# Patient Record
Sex: FEMALE | Race: WHITE | NOT HISPANIC OR LATINO | Employment: FULL TIME | URBAN - METROPOLITAN AREA
[De-identification: names, ages, dates, MRNs, and addresses within clinical notes are randomized per-mention and may not be internally consistent; named-entity substitution may affect disease eponyms.]

---

## 2021-02-26 ENCOUNTER — OFFICE VISIT (OUTPATIENT)
Dept: URGENT CARE | Facility: CLINIC | Age: 41
End: 2021-02-26
Payer: MEDICAID

## 2021-02-26 VITALS
DIASTOLIC BLOOD PRESSURE: 64 MMHG | HEART RATE: 74 BPM | TEMPERATURE: 97.7 F | SYSTOLIC BLOOD PRESSURE: 106 MMHG | OXYGEN SATURATION: 99 % | RESPIRATION RATE: 16 BRPM

## 2021-02-26 DIAGNOSIS — J40 BRONCHITIS: Primary | ICD-10-CM

## 2021-02-26 PROCEDURE — 99203 OFFICE O/P NEW LOW 30 MIN: CPT | Performed by: PHYSICIAN ASSISTANT

## 2021-02-26 RX ORDER — PREDNISONE 20 MG/1
40 TABLET ORAL DAILY
Qty: 10 TABLET | Refills: 0 | Status: SHIPPED | OUTPATIENT
Start: 2021-02-26 | End: 2021-03-03

## 2021-02-26 RX ORDER — ALBUTEROL SULFATE 90 UG/1
1 AEROSOL, METERED RESPIRATORY (INHALATION) EVERY 4 HOURS PRN
COMMUNITY
Start: 2020-11-28

## 2021-02-26 NOTE — PATIENT INSTRUCTIONS
Acute Bronchitis   WHAT YOU NEED TO KNOW:   Acute bronchitis is swelling and irritation in your lungs  It is usually caused by a virus and most often happens in the winter  Bronchitis may also be caused by bacteria or by a chemical irritant, such as smoke  DISCHARGE INSTRUCTIONS:   Return to the emergency department if:   · You cough up blood  · Your lips or fingernails turn blue  · You feel like you are not getting enough air when you breathe  Call your doctor if:   · Your symptoms do not go away or get worse, even after treatment  · Your cough does not get better within 4 weeks  · You have questions or concerns about your condition or care  Medicines: You may  need any of the following:  · Cough suppressants  decrease your urge to cough  · Decongestants  help loosen mucus in your lungs and make it easier to cough up  This can help you breathe easier  · Inhalers  may be given  Your healthcare provider may give you one or more inhalers to help you breathe easier and cough less  An inhaler gives your medicine to open your airways  Ask your healthcare provider to show you how to use your inhaler correctly  · Acetaminophen  decreases pain and fever  It is available without a doctor's order  Ask how much to take and how often to take it  Follow directions  Read the labels of all other medicines you are using to see if they also contain acetaminophen, or ask your doctor or pharmacist  Acetaminophen can cause liver damage if not taken correctly  Do not use more than 4 grams (4,000 milligrams) total of acetaminophen in one day  · NSAIDs  help decrease swelling and pain or fever  This medicine is available with or without a doctor's order  NSAIDs can cause stomach bleeding or kidney problems in certain people  If you take blood thinner medicine, always ask your healthcare provider if NSAIDs are safe for you  Always read the medicine label and follow directions      · Take your medicine as directed  Contact your healthcare provider if you think your medicine is not helping or if you have side effects  Tell him of her if you are allergic to any medicine  Keep a list of the medicines, vitamins, and herbs you take  Include the amounts, and when and why you take them  Bring the list or the pill bottles to follow-up visits  Carry your medicine list with you in case of an emergency  Self-care:   · Drink liquids as directed  You may need to drink more liquids than usual to stay hydrated  Ask how much liquid to drink each day and which liquids are best for you  · Use a cool mist humidifier  to increase air moisture in your home  This may make it easier for you to breathe and help decrease your cough  · Get more rest   Rest helps your body to heal  Slowly start to do more each day  Rest when you feel it is needed  · Avoid irritants in the air  Avoid chemicals, fumes, and dust  Wear a face mask if you must work around dust or fumes  Stay inside on days when air pollution levels are high  If you have allergies, stay inside when pollen counts are high  Do not use aerosol products, such as spray-on deodorant, bug spray, and hair spray  · Do not smoke or be around others who are smoking  Nicotine and other chemicals in cigarettes and cigars can cause lung damage  Ask your healthcare provider for information if you currently smoke and need help to quit  E-cigarettes or smokeless tobacco still contain nicotine  Talk to your healthcare provider before you use these products  Prevent acute bronchitis:       · Get the vaccinations you need  Ask your healthcare provider if you should get the flu or pneumonia vaccines  · Prevent the spread of germs  You can decrease your risk for acute bronchitis and other illnesses by doing the following:     ? Wash your hands often with soap and water  Carry germ-killing hand lotion or gel with you   You can use the lotion or gel to clean your hands when soap and water are not available  ? Do not touch your eyes, nose, or mouth unless you have washed your hands first     ? Always cover your mouth when you cough to prevent the spread of germs  It is best to cough into a tissue or your shirt sleeve instead of into your hand  Ask those around you to cover their mouths when they cough  ? Try to avoid people who have a cold or the flu  If you are sick, stay away from others as much as possible  Follow up with your doctor as directed:  Write down questions you have so you will remember to ask them during your follow-up visits  © Copyright [address] Information is for End User's use only and may not be sold, redistributed or otherwise used for commercial purposes  All illustrations and images included in CareNotes® are the copyrighted property of A D A M , Inc  or [facility] Gerardo kosta   The above information is an  only  It is not intended as medical advice for individual conditions or treatments  Talk to your doctor, nurse or pharmacist before following any medical regimen to see if it is safe and effective for you  Acute Viral Bronchitis:   -The patients hx and exam are consistent with acute bronchitis  No sign of bacterial infection on exam at this time  The patient has been using her albuterol with minimal relief  Will prescribe Prednisone 40mg taken as prescribed  Take with meals    -Stay very well hydrated and rest   -Use a humidifier next to your bed and take steam showers    -Use your albuterol as needed for dyspnea, chest tightness or wheezing  -You can take Mucinex DM for the cough  -Continue smoking cessation  -If you experience fever, chills, worsening cough, shortness of breath or wheezing, dizziness or weakness go to the ED immediately

## 2021-02-26 NOTE — PROGRESS NOTES
[facility]        NAME: Teresa Simon is a 45 y o  female  : 1980    MRN: [id number]  DATE: 2021  TIME: 2:09 PM    Assessment and Plan   Bronchitis [J40]  1  Bronchitis  predniSONE 20 mg tablet         Patient Instructions   Acute Viral Bronchitis:   -The patients hx and exam are consistent with acute bronchitis  No sign of bacterial infection on exam at this time  The patient has been using her albuterol with minimal relief  Will prescribe Prednisone 40mg taken as prescribed  Take with meals    -Stay very well hydrated and rest   -Use a humidifier next to your bed and take steam showers  -Use your albuterol as needed for dyspnea, chest tightness or wheezing  -You can take Mucinex DM for the cough  -Continue smoking cessation  -If you experience fever, chills, worsening cough, shortness of breath or wheezing, dizziness or weakness go to the ED immediately     Follow up with PCP in 3-5 days  Proceed to  ER if symptoms worsen  Chief Complaint     Chief Complaint   Patient presents with    Cold Like Symptoms     pt presents with chest congestion, hoarse voice, started 3 days ago         History of Present Illness       The patient presents today for nasal congestion, chest congestion, hoarseness and occasional dry cough  She also notes headache  She states that yesterday she had mild wheezing  She also notes chest tightness  She has a hx of seasonal allergies and states that she is prone to bronchitis  She has been using her albuterol inhaler for the tightness which she has from a past rx for bronchitis  She states that she is a smoker x 20 years-she states that she quit three days ago  No fever, chills, myalgias, wheezing, dyspnea, cp, palpitations, dizziness or weakness  No GI sx  No loss of taste or smell  No sick contacts or recent travel  Review of Systems   Review of Systems   Constitutional: Positive for fatigue   Negative for activity change, appetite change, chills, diaphoresis and fever  HENT: Positive for congestion  Negative for ear discharge, ear pain, facial swelling, hearing loss, postnasal drip, rhinorrhea, sinus pressure, sinus pain, sore throat, tinnitus, trouble swallowing and voice change  Eyes: Negative for visual disturbance  Respiratory: Positive for cough, chest tightness and wheezing  Negative for apnea, shortness of breath and stridor  Cardiovascular: Negative for chest pain, palpitations and leg swelling  Gastrointestinal: Negative for abdominal distention and abdominal pain  Genitourinary: Negative for decreased urine volume  Musculoskeletal: Negative for arthralgias, joint swelling, myalgias, neck pain and neck stiffness  Skin: Negative for rash  Allergic/Immunologic: Negative for immunocompromised state  Neurological: Negative for dizziness, weakness, light-headedness, numbness and headaches  Hematological: Negative for adenopathy  Current Medications       Current Outpatient Medications:     albuterol (PROVENTIL HFA,VENTOLIN HFA) 90 mcg/act inhaler, 1 puff every 4 (four) hours as needed , Disp: , Rfl:     predniSONE 20 mg tablet, Take 2 tablets (40 mg total) by mouth daily for 5 days, Disp: 10 tablet, Rfl: 0    Current Allergies     Allergies as of 2021 - Reviewed 2021   Allergen Reaction Noted    Ketorolac Swelling 2021            The following portions of the patient's history were reviewed and updated as appropriate: allergies, current medications, past family history, past medical history, past social history, past surgical history and problem list      Past Medical History:   Diagnosis Date    Patient denies medical problems        Past Surgical History:   Procedure Laterality Date     SECTION      CHOLECYSTECTOMY         Family History   Adopted: Yes         Medications have been verified          Objective   /64   Pulse 74   Temp 97 7 °F (36 5 °C)   Resp 16   LMP 01/24/2021   SpO2 99%   Patient's last menstrual period was 01/24/2021  Physical Exam     Physical Exam  Vitals signs and nursing note reviewed  Constitutional:       General: She is not in acute distress  Appearance: She is well-developed  She is not diaphoretic  HENT:      Head: Normocephalic and atraumatic  Right Ear: Hearing, tympanic membrane, ear canal and external ear normal       Left Ear: Hearing, tympanic membrane, ear canal and external ear normal       Nose: Congestion present  No mucosal edema or rhinorrhea  Right Sinus: No maxillary sinus tenderness or frontal sinus tenderness  Left Sinus: No maxillary sinus tenderness or frontal sinus tenderness  Mouth/Throat:      Pharynx: Uvula midline  No oropharyngeal exudate, posterior oropharyngeal erythema or uvula swelling  Tonsils: No tonsillar abscesses  Comments: Hoarseness   Neck:      Musculoskeletal: Normal range of motion and neck supple  Cardiovascular:      Rate and Rhythm: Normal rate and regular rhythm  Heart sounds: S1 normal and S2 normal  Heart sounds not distant  No murmur  No friction rub  No gallop  No S3 or S4 sounds  Pulmonary:      Effort: No tachypnea, bradypnea, accessory muscle usage or respiratory distress  Breath sounds: Normal air entry  No decreased air movement or transmitted upper airway sounds  Decreased breath sounds present  No wheezing, rhonchi or rales  Comments: Decreased breath sounds bilaterally L>R  No wheezing  Lymphadenopathy:      Cervical: No cervical adenopathy  Neurological:      Mental Status: She is alert and oriented to person, place, and time     Psychiatric:         Behavior: Behavior normal

## 2021-07-29 ENCOUNTER — OFFICE VISIT (OUTPATIENT)
Dept: URGENT CARE | Facility: CLINIC | Age: 41
End: 2021-07-29
Payer: MEDICAID

## 2021-07-29 VITALS — OXYGEN SATURATION: 99 % | TEMPERATURE: 97.8 F | RESPIRATION RATE: 16 BRPM | WEIGHT: 195 LBS | HEART RATE: 85 BPM

## 2021-07-29 DIAGNOSIS — T63.441A BEE STING, ACCIDENTAL OR UNINTENTIONAL, INITIAL ENCOUNTER: Primary | ICD-10-CM

## 2021-07-29 PROCEDURE — 99213 OFFICE O/P EST LOW 20 MIN: CPT | Performed by: PHYSICIAN ASSISTANT

## 2021-07-29 RX ORDER — DIPHENHYDRAMINE HCL 25 MG
50 TABLET ORAL ONCE
Status: COMPLETED | OUTPATIENT
Start: 2021-07-29 | End: 2021-07-29

## 2021-07-29 RX ORDER — METHYLPREDNISOLONE 4 MG/1
TABLET ORAL
Qty: 1 EACH | Refills: 0 | Status: SHIPPED | OUTPATIENT
Start: 2021-07-29

## 2021-07-29 RX ORDER — CEPHALEXIN 500 MG/1
CAPSULE ORAL
COMMUNITY
Start: 2021-07-23

## 2021-07-29 RX ADMIN — Medication 50 MG: at 15:09

## 2021-07-29 NOTE — PATIENT INSTRUCTIONS
Insect Bite or Sting   WHAT YOU NEED TO KNOW:   Most insect bites and stings are not dangerous and go away without treatment  Your symptoms may be mild, or you may develop anaphylaxis  Anaphylaxis is a sudden, life-threatening reaction that needs immediate treatment  Common examples of insects that bite or sting are bees, ticks, mosquitoes, spiders, and ants  Insect bites or stings can lead to diseases such as malaria, West Nile virus, Lyme disease, or [place] Spotted Fever  DISCHARGE INSTRUCTIONS:   Call your local emergency number (911 in the [facility] [facility]) for signs or symptoms of anaphylaxis,  such as trouble breathing, swelling in your mouth or throat, or wheezing  You may also have itching, a rash, hives, or feel like you are going to faint  Return to the emergency department if:   · You are stung on your tongue or in your throat  · A white area forms around the bite  · You are sweating badly or have body pain  · You think you were bitten or stung by a poisonous insect  Call your doctor if:   · You have a fever  · The area becomes red, warm, tender, and swollen beyond the area of the bite or sting  · You have questions or concerns about your condition or care  Medicines: You may need any of the following:  · Antihistamines  decrease itching and rash  · Epinephrine  is used to treat severe allergic reactions such as anaphylaxis  · Take your medicine as directed  Contact your healthcare provider if you think your medicine is not helping or if you have side effects  Tell him of her if you are allergic to any medicine  Keep a list of the medicines, vitamins, and herbs you take  Include the amounts, and when and why you take them  Bring the list or the pill bottles to follow-up visits  Carry your medicine list with you in case of an emergency  Steps to take for signs or symptoms of anaphylaxis:   · Immediately  give 1 shot of epinephrine only into the outer thigh muscle  · Leave the shot in place  as directed  Your healthcare provider may recommend you leave it in place for up to 10 seconds before you remove it  This helps make sure all of the epinephrine is delivered  · Call 911 and go to the emergency department,  even if the shot improved symptoms  Do not drive yourself  Bring the used epinephrine shot with you  Safety precautions to take if you are at risk for anaphylaxis:   · Keep 2 shots of epinephrine with you at all times  You may need a second shot, because epinephrine only works for about 20 minutes and symptoms may return  Your healthcare provider can show you and family members how to give the shot  Check the expiration date every month and replace it before it expires  · Create an action plan  Your healthcare provider can help you create a written plan that explains the allergy and an emergency plan to treat a reaction  The plan explains when to give a second epinephrine shot if symptoms return or do not improve after the first  Give copies of the action plan and emergency instructions to family members, work and school staff, and  providers  Show them how to give a shot of epinephrine  · Carry medical alert identification  Wear medical alert jewelry or carry a card that says you have an insect allergy  Ask your healthcare provider where to get these items  If an insect bites or stings you:   · Remove the stinger  Scrape the stinger out with your fingernail, edge of a credit card, or a knife blade  Do not squeeze the wound  Gently wash the area with soap and water  · Remove the tick  Ticks must be removed as soon as possible so you do not get diseases passed through tick bites  Ask your healthcare provider for more information on tick bites and how to remove ticks  Care for a bite or sting wound:   · Elevate (raise) the area above the level of your heart, if possible  Prop the area on pillows to keep it raised comfortably  Elevate the area for 10 to 20 minutes each hour or as directed by your healthcare provider  · Use compresses  Soak a clean washcloth in cold water, wring it out, and put it on the bite or sting  Use the compress for 10 to 20 minutes each hour or as directed by your healthcare provider  After 24 to 48 hours, change to warm compresses  · Apply a paste  Add water to baking soda to make a thick paste  Put the paste on the area for 5 minutes  Rinse gently to remove the paste  Prevent another insect bite or sting:   · Do not wear bright-colored or flower-print clothing when you plan to spend time outdoors  Do not use hairspray, perfumes, or aftershave  · Do not leave food out  · Empty any standing water and wash container with soap and water every 2 days  · Put screens on all open windows and doors  · Put insect repellent that contains DEET on skin that is showing when you go outside  Put insect repellent at the top of your boots, bottom of pant legs, and sleeve cuffs  Wear long sleeves, pants, and shoes  · Use citronella candles outdoors to help keep mosquitoes away  Put a tick and flea collar on pets  Follow up with your doctor as directed:  Write down your questions so you remember to ask them during your visits  © Copyright [employer] 2021 Information is for End User's use only and may not be sold, redistributed or otherwise used for commercial purposes  All illustrations and images included in CareNotes® are the copyrighted property of A D A M , Inc  or [address] [place] [address]  The above information is an  only  It is not intended as medical advice for individual conditions or treatments  Talk to your doctor, nurse or pharmacist before following any medical regimen to see if it is safe and effective for you  Bee sting of the R ankle:   -The patient has declined solumedrol injection as she is afraid of needles   She is stable and shows no sign of anaphylactic shock or allergic reaction  She was given oral benadryl 50mg today in the office and monitored  Medrol Dose John was prescribed to be started today  Take with meals    -Keep the area clean, dry, you can apply an ice pack and elevate for comfort  You can apply neosporin or bacitracin daily    -Follow up with your PCP in two days for a wound check and follow up   -Tylenol for the pain  -Benadryl as discussed for itching   -If the wound becomes red, swollen, more tender or begins to drain a purulent material follow up with PCP immediately  If you experience signs of an allergic reaction like trouble breathing or swallowing, dizziness, weakness, wheezing go immediately to the ED

## 2021-07-29 NOTE — PROGRESS NOTES
[facility]        NAME: Archana Barber is a 45 y o  female  : 1980    MRN: [id number]  DATE: 2021  TIME: 4:20 PM    Assessment and Plan   Bee sting, accidental or unintentional, initial encounter [T63 441A]  1  Bee sting, accidental or unintentional, initial encounter  diphenhydrAMINE (BENADRYL) tablet 50 mg    methylPREDNISolone 4 MG tablet therapy pack         Patient Instructions   Bee sting of the R ankle:   -The patient has declined solumedrol injection as she is afraid of needles  She is stable and shows no sign of anaphylactic shock or allergic reaction  She was given oral benadryl 50mg today in the office and monitored  Medrol Dose John was prescribed to be started today  Take with meals    -Keep the area clean, dry, you can apply an ice pack and elevate for comfort  You can apply neosporin or bacitracin daily    -Follow up with your PCP in two days for a wound check and follow up   -Tylenol for the pain  -Benadryl as discussed for itching   -If the wound becomes red, swollen, more tender or begins to drain a purulent material follow up with PCP immediately  If you experience signs of an allergic reaction like trouble breathing or swallowing, dizziness, weakness, wheezing go immediately to the ED  Follow up with PCP in 3-5 days  Proceed to  ER if symptoms worsen  Chief Complaint     Chief Complaint   Patient presents with   Margaux Nova Sting     pt presents with a bee sting on the right foot, pt allergic to bees'         History of Present Illness       The patient is a 20-year-old female who presents today for a bee sting of the right ankle  The patient states that while mowing the grass 1 hour ago she ran over a ground hornet and nest   She states that she had 8-10 hornets on her pant leg and 1 of them got into her pants and stung her anterior right ankle  She states that she had immediate erythema and edema of the area    She states that she does have a known history of bee sting allergy but has never had an episode of anaphylaxis and does not carry an EpiPen  She states that she can to our office today as she was having anxiety and was concerned that she was having tightness in her throat  She had not attempted any over-the-counter measures  There is no fever chills or body aches  No diffuse hives or rash  No dyspnea or chest tightness  No chest pain or palpitations  No dizziness or weakness  Review of Systems   Review of Systems   Constitutional: Negative for activity change, appetite change, chills, diaphoresis, fatigue and fever  HENT: Positive for trouble swallowing  Negative for facial swelling, sore throat and voice change  Respiratory: Negative for chest tightness, shortness of breath, wheezing and stridor  Cardiovascular: Negative for chest pain and palpitations  Musculoskeletal: Negative for arthralgias and joint swelling  Skin: Positive for rash  Allergic/Immunologic: Negative for environmental allergies, food allergies and immunocompromised state  Neurological: Negative for dizziness, weakness and light-headedness  Hematological: Negative for adenopathy  Does not bruise/bleed easily  Current Medications       Current Outpatient Medications:     albuterol (PROVENTIL HFA,VENTOLIN HFA) 90 mcg/act inhaler, 1 puff every 4 (four) hours as needed , Disp: , Rfl:     cephalexin (KEFLEX) 500 mg capsule, , Disp: , Rfl:     methylPREDNISolone 4 MG tablet therapy pack, Use as directed on package, Disp: 1 each, Rfl: 0  No current facility-administered medications for this visit      Current Allergies     Allergies as of 07/29/2021 - Reviewed 07/29/2021   Allergen Reaction Noted    Ketorolac Swelling 02/26/2021    Latex Hives 08/17/2019    Morphine Rash 10/25/2018            The following portions of the patient's history were reviewed and updated as appropriate: allergies, current medications, past family history, past medical history, past social history, past surgical history and problem list      Past Medical History:   Diagnosis Date    Allergic     Patient denies medical problems        Past Surgical History:   Procedure Laterality Date     SECTION      CHOLECYSTECTOMY      DILATION AND CURETTAGE OF UTERUS         Family History   Adopted: Yes         Medications have been verified  Objective   Pulse 85   Temp 97 8 °F (36 6 °C)   Resp 16   Wt 88 5 kg (195 lb)   LMP 2021 (Exact Date)   SpO2 99%   Patient's last menstrual period was 2021 (exact date)  Physical Exam     Physical Exam  Vitals and nursing note reviewed  Constitutional:       General: She is not in acute distress  Appearance: Normal appearance  She is well-developed  She is not ill-appearing, toxic-appearing or diaphoretic  Comments: The patient is well appearing in no acute distress    HENT:      Head: Normocephalic and atraumatic  Right Ear: Hearing, tympanic membrane, ear canal and external ear normal       Left Ear: Hearing, tympanic membrane, ear canal and external ear normal       Nose: Nose normal       Mouth/Throat:      Lips: Pink  Mouth: Mucous membranes are moist  No oral lesions or angioedema  Tongue: No lesions  Pharynx: Oropharynx is clear  Uvula midline  No posterior oropharyngeal erythema or uvula swelling  Comments: Airway is patent  No oropharyngeal edema or erythema  No edema of the tongue  Cardiovascular:      Rate and Rhythm: Normal rate and regular rhythm  No extrasystoles are present  Heart sounds: Normal heart sounds, S1 normal and S2 normal    Pulmonary:      Effort: Pulmonary effort is normal  No tachypnea, accessory muscle usage or respiratory distress  Breath sounds: Normal breath sounds and air entry  No stridor, decreased air movement or transmitted upper airway sounds  No decreased breath sounds, wheezing, rhonchi or rales     Skin:     General: Skin is warm and dry       Capillary Refill: Capillary refill takes less than 2 seconds  Findings: Lesion present  Comments: There is a approximately 1 cm area of erythema over that right lower anterior shin  There is a central area where the wasp stung her that is raised and tender  No stinger or foreign body appreciated  Neurological:      Mental Status: She is alert

## 2022-08-26 ENCOUNTER — OFFICE VISIT (OUTPATIENT)
Dept: URGENT CARE | Facility: CLINIC | Age: 42
End: 2022-08-26
Payer: MEDICAID

## 2022-08-26 VITALS — HEART RATE: 96 BPM | TEMPERATURE: 97.5 F | RESPIRATION RATE: 22 BRPM | OXYGEN SATURATION: 98 % | WEIGHT: 197 LBS

## 2022-08-26 DIAGNOSIS — U07.1 COVID-19: ICD-10-CM

## 2022-08-26 DIAGNOSIS — J06.9 ACUTE URI: Primary | ICD-10-CM

## 2022-08-26 LAB
SARS-COV-2 AG UPPER RESP QL IA: POSITIVE
VALID CONTROL: ABNORMAL

## 2022-08-26 PROCEDURE — 87811 SARS-COV-2 COVID19 W/OPTIC: CPT | Performed by: FAMILY MEDICINE

## 2022-08-26 PROCEDURE — 99213 OFFICE O/P EST LOW 20 MIN: CPT | Performed by: FAMILY MEDICINE

## 2022-08-26 RX ORDER — ONDANSETRON 4 MG/1
4 TABLET, ORALLY DISINTEGRATING ORAL EVERY 6 HOURS PRN
Qty: 20 TABLET | Refills: 0 | Status: SHIPPED | OUTPATIENT
Start: 2022-08-26

## 2022-08-26 RX ORDER — NIRMATRELVIR AND RITONAVIR 300-100 MG
3 KIT ORAL 2 TIMES DAILY
Qty: 30 TABLET | Refills: 0 | Status: SHIPPED | OUTPATIENT
Start: 2022-08-26 | End: 2022-08-31

## 2022-08-26 NOTE — PROGRESS NOTES
[facility]        NAME: Sade Hair is a 37 y o  female  : 1980    MRN: [id number]  DATE: 2022  TIME: 10:03 AM    Assessment and Plan   Acute URI [J06 9]  1  Acute URI  Poct Covid 19 Rapid Antigen Test   2  COVID-19  nirmatrelvir & ritonavir (Paxlovid, 300/100,) tablet therapy pack    ondansetron (Zofran ODT) 4 mg disintegrating tablet         Patient Instructions       Follow up with PCP in 3-5 days  Proceed to  ER if symptoms worsen  Chief Complaint     Chief Complaint   Patient presents with    covid eval     Pt here   for  concerns of Covid   Pt ill  x4 days   fever, nausea,  diarrhea,  chest tightness,  cough  Pt used Tylenol  and Motrin  History of Present Illness       covid eval (Pt here   for  concerns of Covid   Pt ill  x4 days   fever, nausea,  diarrhea,  chest tightness,  cough   Pt used Tylenol  and Motrin )        Nausea  This is a new problem  The current episode started in the past 7 days  The problem has been gradually worsening  Associated symptoms include coughing, fatigue, a fever and nausea  Review of Systems   Review of Systems   Constitutional: Positive for fatigue and fever  HENT: Negative  Respiratory: Positive for cough and chest tightness  Cardiovascular: Negative  Gastrointestinal: Positive for nausea           Current Medications       Current Outpatient Medications:     nirmatrelvir & ritonavir (Paxlovid, 300/100,) tablet therapy pack, Take 3 tablets by mouth 2 (two) times a day for 5 days Take 2 nirmatrelvir tablets + 1 ritonavir tablet together per dose, Disp: 30 tablet, Rfl: 0    ondansetron (Zofran ODT) 4 mg disintegrating tablet, Take 1 tablet (4 mg total) by mouth every 6 (six) hours as needed for nausea or vomiting, Disp: 20 tablet, Rfl: 0    Current Allergies     Allergies as of 2022 - Reviewed 2022   Allergen Reaction Noted    Ketorolac Swelling 2021    Latex Hives 2019    Morphine Rash 10/25/2018            The following portions of the patient's history were reviewed and updated as appropriate: allergies, current medications, past family history, past medical history, past social history, past surgical history and problem list      Past Medical History:   Diagnosis Date    Allergic     History of IBS     Patient denies medical problems        Past Surgical History:   Procedure Laterality Date     SECTION      CHOLECYSTECTOMY      DILATION AND CURETTAGE OF UTERUS         Family History   Adopted: Yes         Medications have been verified  Objective   Pulse 96   Temp 97 5 °F (36 4 °C) (Tympanic)   Resp 22   Wt 89 4 kg (197 lb)   SpO2 98%   No LMP recorded  Physical Exam     Physical Exam  Vitals and nursing note reviewed  Constitutional:       Appearance: Normal appearance  She is well-developed  HENT:      Right Ear: External ear normal       Left Ear: External ear normal       Nose: Nose normal       Mouth/Throat:      Pharynx: No oropharyngeal exudate  Eyes:      Conjunctiva/sclera: Conjunctivae normal    Cardiovascular:      Rate and Rhythm: Normal rate and regular rhythm  Heart sounds: Normal heart sounds  No murmur heard  Pulmonary:      Effort: Pulmonary effort is normal  No respiratory distress  Breath sounds: Normal breath sounds  No wheezing or rales  Chest:      Chest wall: No tenderness  Abdominal:      Palpations: Abdomen is soft  Musculoskeletal:         General: Normal range of motion  Cervical back: Normal range of motion and neck supple  Lymphadenopathy:      Cervical: No cervical adenopathy  Skin:     General: Skin is warm  Findings: No erythema or rash  Neurological:      Mental Status: She is alert and oriented to person, place, and time

## 2022-12-28 ENCOUNTER — OFFICE VISIT (OUTPATIENT)
Dept: URGENT CARE | Facility: CLINIC | Age: 42
End: 2022-12-28

## 2022-12-28 VITALS — RESPIRATION RATE: 16 BRPM | TEMPERATURE: 97.8 F | OXYGEN SATURATION: 100 % | HEART RATE: 68 BPM

## 2022-12-28 DIAGNOSIS — U07.1 COVID-19: Primary | ICD-10-CM

## 2022-12-29 NOTE — PROGRESS NOTES
[facility]        NAME: Gerold Closs is a 40 y o  female  : 1980    MRN: [id number]  DATE: 2022  TIME: 7:48 PM    Assessment and Plan   COVID-19 [U07 1]  1  COVID-19          Provided patient with reassurance that her oxygen is 100% and heart rate is normal at mid 60s  She appears well to me  If symptoms should recur or worsen she needs to go immediately to ED  Patient verbalized understanding and is thankful for today's visit  Discussed strict return to care precautions as well as red flag symptoms which should prompt immediate ED referral  Pt verbalized understanding and is in agreement with plan  Please follow up with your primary care provider within the next week  Please remember that your visit today was with an urgent care provider and should not replace follow up with your primary care provider for chronic medical issues or annual physicals  Patient Instructions       Follow up with PCP in 3-5 days  Proceed to  ER if symptoms worsen  Chief Complaint     Chief Complaint   Patient presents with   • Cold Like Symptoms     Pt presents with chest congestion, chills, diarrhea, on the weekend; sinus congestion, felt better on Mon/Tu, worsened today; tested positive Covid         History of Present Illness       Patient is a 45-year-old female presenting out of concern for blue lips  States she tested positive for COVID 5 days ago and today started to feel dizzy with standing and noticed the corners of her lips turn blue  She called her boyfriend to come home, who also noted that her lips were blue  She currently denies chest pain, shortness of breath, hemoptysis, wheezing, leg swelling pain erythema  Has been very thirsty and trying to drink a lot of water  She denies personal or family history of blood clots  She does smoke  She does not use hormonal birth control        Review of Systems   Review of Systems   Constitutional:        Negative except as noted in HPI   Respiratory: Negative for shortness of breath  Cardiovascular: Negative for chest pain  Current Medications     No current outpatient medications on file  Current Allergies     Allergies as of 2022 - Reviewed 2022   Allergen Reaction Noted   • Ketorolac Swelling 2021   • Latex Hives 2019   • Morphine Rash 10/25/2018            The following portions of the patient's history were reviewed and updated as appropriate: allergies, current medications, past family history, past medical history, past social history, past surgical history and problem list      Past Medical History:   Diagnosis Date   • Allergic    • History of IBS    • Patient denies medical problems        Past Surgical History:   Procedure Laterality Date   •  SECTION     • CHOLECYSTECTOMY     • DILATION AND CURETTAGE OF UTERUS         Family History   Adopted: Yes         Medications have been verified  Objective   Pulse 68   Temp 97 8 °F (36 6 °C)   Resp 16   SpO2 100%        Physical Exam     Physical Exam  Vitals and nursing note reviewed  Constitutional:       General: She is not in acute distress  Appearance: Normal appearance  She is not toxic-appearing  HENT:      Head: Normocephalic and atraumatic  Right Ear: Tympanic membrane, ear canal and external ear normal       Left Ear: Tympanic membrane, ear canal and external ear normal       Nose: No congestion  Mouth/Throat:      Lips: Pink  No lesions  Mouth: Mucous membranes are moist       Pharynx: Oropharynx is clear  No oropharyngeal exudate or posterior oropharyngeal erythema  Eyes:      Conjunctiva/sclera: Conjunctivae normal       Pupils: Pupils are equal, round, and reactive to light  Cardiovascular:      Rate and Rhythm: Normal rate and regular rhythm  Heart sounds: Normal heart sounds  Pulmonary:      Effort: Pulmonary effort is normal  No respiratory distress        Breath sounds: Normal breath sounds  No wheezing, rhonchi or rales  Abdominal:      General: Abdomen is flat  Palpations: Abdomen is soft  Musculoskeletal:      Cervical back: Normal range of motion and neck supple  Skin:     General: Skin is warm and dry  Capillary Refill: Capillary refill takes less than 2 seconds  Neurological:      Mental Status: She is alert and oriented to person, place, and time     Psychiatric:         Behavior: Behavior normal

## 2023-02-13 ENCOUNTER — APPOINTMENT (EMERGENCY)
Dept: RADIOLOGY | Facility: HOSPITAL | Age: 43
End: 2023-02-13

## 2023-02-13 ENCOUNTER — HOSPITAL ENCOUNTER (EMERGENCY)
Facility: HOSPITAL | Age: 43
Discharge: HOME | End: 2023-02-13
Attending: EMERGENCY MEDICINE

## 2023-02-13 VITALS
HEART RATE: 95 BPM | RESPIRATION RATE: 22 BRPM | SYSTOLIC BLOOD PRESSURE: 117 MMHG | OXYGEN SATURATION: 98 % | WEIGHT: 193.12 LBS | TEMPERATURE: 97.6 F | DIASTOLIC BLOOD PRESSURE: 76 MMHG

## 2023-02-13 DIAGNOSIS — M70.50 PES ANSERINE BURSITIS: ICD-10-CM

## 2023-02-13 DIAGNOSIS — R10.9 ABDOMINAL PAIN: Primary | ICD-10-CM

## 2023-02-13 LAB
ANION GAP SERPL CALCULATED.3IONS-SCNC: 9 MMOL/L (ref 4–13)
APTT PPP: 33 SECONDS (ref 23–37)
BASOPHILS # BLD AUTO: 0.06 THOUSANDS/ÂΜL (ref 0–0.1)
BASOPHILS NFR BLD AUTO: 1 % (ref 0–1)
BILIRUB UR QL STRIP: ABNORMAL
BUN SERPL-MCNC: 12 MG/DL (ref 5–25)
CALCIUM SERPL-MCNC: 8.8 MG/DL (ref 8.3–10.1)
CHLORIDE SERPL-SCNC: 105 MMOL/L (ref 96–108)
CLARITY UR: ABNORMAL
CO2 SERPL-SCNC: 26 MMOL/L (ref 21–32)
COLOR UR: YELLOW
CREAT SERPL-MCNC: 0.89 MG/DL (ref 0.6–1.3)
D DIMER PPP FEU-MCNC: 0.64 UG/ML FEU
EOSINOPHIL # BLD AUTO: 0.17 THOUSAND/ÂΜL (ref 0–0.61)
EOSINOPHIL NFR BLD AUTO: 2 % (ref 0–6)
ERYTHROCYTE [DISTWIDTH] IN BLOOD BY AUTOMATED COUNT: 14.1 % (ref 11.6–15.1)
EXT PREGNANCY TEST URINE: NEGATIVE
EXT. CONTROL: NORMAL
GFR SERPL CREATININE-BSD FRML MDRD: 80 ML/MIN/1.73SQ M
GLUCOSE SERPL-MCNC: 94 MG/DL (ref 65–140)
GLUCOSE UR STRIP-MCNC: NEGATIVE MG/DL
HCT VFR BLD AUTO: 42 % (ref 34.8–46.1)
HGB BLD-MCNC: 14 G/DL (ref 11.5–15.4)
HGB UR QL STRIP.AUTO: NEGATIVE
IMM GRANULOCYTES # BLD AUTO: 0.02 THOUSAND/UL (ref 0–0.2)
IMM GRANULOCYTES NFR BLD AUTO: 0 % (ref 0–2)
INR PPP: 0.93 (ref 0.84–1.19)
KETONES UR STRIP-MCNC: NEGATIVE MG/DL
LEUKOCYTE ESTERASE UR QL STRIP: NEGATIVE
LYMPHOCYTES # BLD AUTO: 3.1 THOUSANDS/ÂΜL (ref 0.6–4.47)
LYMPHOCYTES NFR BLD AUTO: 34 % (ref 14–44)
MCH RBC QN AUTO: 31 PG (ref 26.8–34.3)
MCHC RBC AUTO-ENTMCNC: 33.3 G/DL (ref 31.4–37.4)
MCV RBC AUTO: 93 FL (ref 82–98)
MONOCYTES # BLD AUTO: 0.6 THOUSAND/ÂΜL (ref 0.17–1.22)
MONOCYTES NFR BLD AUTO: 7 % (ref 4–12)
NEUTROPHILS # BLD AUTO: 5.27 THOUSANDS/ÂΜL (ref 1.85–7.62)
NEUTS SEG NFR BLD AUTO: 56 % (ref 43–75)
NITRITE UR QL STRIP: NEGATIVE
NRBC BLD AUTO-RTO: 0 /100 WBCS
PH UR STRIP.AUTO: 6 [PH]
PLATELET # BLD AUTO: 356 THOUSANDS/UL (ref 149–390)
PMV BLD AUTO: 10.3 FL (ref 8.9–12.7)
POTASSIUM SERPL-SCNC: 4.3 MMOL/L (ref 3.5–5.3)
PROT UR STRIP-MCNC: NEGATIVE MG/DL
PROTHROMBIN TIME: 12.6 SECONDS (ref 11.6–14.5)
RBC # BLD AUTO: 4.51 MILLION/UL (ref 3.81–5.12)
SODIUM SERPL-SCNC: 140 MMOL/L (ref 135–147)
SP GR UR STRIP.AUTO: 1.02 (ref 1–1.03)
UROBILINOGEN UR QL STRIP.AUTO: 0.2 E.U./DL
WBC # BLD AUTO: 9.22 THOUSAND/UL (ref 4.31–10.16)

## 2023-02-13 RX ORDER — ALBUTEROL SULFATE 90 UG/1
2 AEROSOL, METERED RESPIRATORY (INHALATION) EVERY 6 HOURS PRN
COMMUNITY

## 2023-02-13 NOTE — ED PROVIDER NOTES
History  Chief Complaint   Patient presents with   • Abdominal Pain     C/o  lower abd pain for a couple of days  Sharad Baltazar knows she has uterine fibroids and ovarian cysts   • Leg Pain     Started with L leg pain a couple of days  Ago  Feels tight and squeezing on leg     41 y/o female presenting today with multiple complaints, states that 2 days ago started with left anterior knee pain, feels as though the area is swollen and her leg is tight  States that pain began after lifting heavy object, has not had any ankle or Achilles tenderness  Has not had any chest pain or shortness of breath  States that she has had some left lower quadrant abdominal pain over the past few days accompanied with lower pelvic burning  States she was recently diagnosed with uterine fibroid and ovarian cyst on the left side  Was seen at [facility] on 2023  Recently had a miscarriage  Has not had any vaginal bleeding  Late for her menses once again  Last bowel movement was yesterday  Denies nausea, vomiting, chest pain, shortness of breath, fevers, bleeding or discharge  [facility] 23:   Limited evaluation as described  A 0 5 cm small echogenic structure in the uterus, which may represent a fibroid versus calcification  No sonographic evidence of ovarian torsion is demonstrated on the submitted images  Simple left ovarian cyst              Prior to Admission Medications   Prescriptions Last Dose Informant Patient Reported? Taking?    albuterol (PROVENTIL HFA,VENTOLIN HFA) 90 mcg/act inhaler More than a month  Yes No   Sig: Inhale 2 puffs every 6 (six) hours as needed for wheezing      Facility-Administered Medications: None       Past Medical History:   Diagnosis Date   • Allergic    • History of IBS    • Ovarian cyst    • Patient denies medical problems    • Uterine fibroid        Past Surgical History:   Procedure Laterality Date   •  SECTION     • CHOLECYSTECTOMY     • DILATION AND CURETTAGE Vaccine Information Statement(s) was given today. This has been reviewed, questions answered, and verbal consent given by Patient for injection(s) and administration of Influenza (Inactivated).    Patient tolerated without incident. See immunization grid for documentation.         OF UTERUS         Family History   Adopted: Yes     I have reviewed and agree with the history as documented  E-Cigarette/Vaping   • E-Cigarette Use Never User      E-Cigarette/Vaping Substances     Social History     Tobacco Use   • Smoking status: Every Day     Types: Cigarettes     Last attempt to quit: 2021     Years since quittin 9     Passive exposure: Past   • Smokeless tobacco: Never   Vaping Use   • Vaping Use: Never used   Substance Use Topics   • Alcohol use: Yes   • Drug use: Never       Review of Systems   Constitutional: Negative  Negative for chills, fatigue and fever  HENT: Negative  Negative for congestion, postnasal drip, rhinorrhea and sore throat  Eyes: Negative  Respiratory: Negative  Negative for cough, shortness of breath and wheezing  Cardiovascular: Positive for leg swelling  Negative for chest pain and palpitations  Gastrointestinal: Positive for abdominal pain  Negative for abdominal distention, anal bleeding, blood in stool, constipation, diarrhea, nausea and vomiting  Endocrine: Negative  Genitourinary: Negative  Musculoskeletal: Positive for arthralgias  Negative for back pain, gait problem, joint swelling, myalgias, neck pain and neck stiffness  Skin: Negative  Neurological: Negative  Hematological: Negative  Psychiatric/Behavioral: Negative  All other systems reviewed and are negative  Physical Exam  Physical Exam  Vitals and nursing note reviewed  Constitutional:       General: She is not in acute distress  Appearance: She is well-developed  She is obese  She is not diaphoretic  HENT:      Head: Normocephalic and atraumatic  Right Ear: External ear normal       Left Ear: External ear normal       Nose: Nose normal       Mouth/Throat:      Pharynx: No oropharyngeal exudate  Eyes:      General: No scleral icterus  Right eye: No discharge  Left eye: No discharge        Conjunctiva/sclera: Conjunctivae normal       Pupils: Pupils are equal, round, and reactive to light  Cardiovascular:      Rate and Rhythm: Normal rate and regular rhythm  Pulses: Normal pulses  Heart sounds: Normal heart sounds  No murmur heard  No friction rub  No gallop  Pulmonary:      Effort: Pulmonary effort is normal  No respiratory distress  Breath sounds: Normal breath sounds  No wheezing or rales  Comments: SPO2 is 98% indicating adequate oxygenation  Chest:      Chest wall: No tenderness  Abdominal:      General: Bowel sounds are normal  There is no distension  Palpations: Abdomen is soft  There is no mass  Tenderness: There is abdominal tenderness in the left lower quadrant  There is no right CVA tenderness, left CVA tenderness, guarding or rebound  Hernia: No hernia is present  Musculoskeletal:      Cervical back: Normal range of motion and neck supple  Legs:    Lymphadenopathy:      Cervical: No cervical adenopathy  Skin:     General: Skin is warm and dry  Capillary Refill: Capillary refill takes less than 2 seconds  Coloration: Skin is not pale  Findings: No erythema or rash  Neurological:      General: No focal deficit present  Mental Status: She is alert and oriented to person, place, and time  Mental status is at baseline     Psychiatric:         Mood and Affect: Mood normal          Vital Signs  ED Triage Vitals [02/13/23 1215]   Temperature Pulse Respirations Blood Pressure SpO2   97 6 °F (36 4 °C) 95 22 117/76 98 %      Temp Source Heart Rate Source Patient Position - Orthostatic VS BP Location FiO2 (%)   Temporal Monitor Sitting Right arm --      Pain Score       5           Vitals:    02/13/23 1215   BP: 117/76   Pulse: 95   Patient Position - Orthostatic VS: Sitting         Visual Acuity      ED Medications  Medications - No data to display    Diagnostic Studies  Results Reviewed     Procedure Component Value Units Date/Time    D-dimer, quantitative [[id number]]  (Abnormal) Collected: 02/13/23 1315    Lab Status: Final result Specimen: Blood from Arm, Left Updated: 02/13/23 1342     D-Dimer, Quant 0 64 ug/ml FEU     Protime-INR [[id number]]  (Normal) Collected: 02/13/23 1315    Lab Status: Final result Specimen: Blood from Arm, Left Updated: 02/13/23 1334     Protime 12 6 seconds      INR 0 93    APTT [[id number]]  (Normal) Collected: 02/13/23 1315    Lab Status: Final result Specimen: Blood from Arm, Left Updated: 02/13/23 1334     PTT 33 seconds     Basic metabolic panel [[id number]] Collected: 02/13/23 1315    Lab Status: Final result Specimen: Blood from Arm, Left Updated: 02/13/23 1333     Sodium 140 mmol/L      Potassium 4 3 mmol/L      Chloride 105 mmol/L      CO2 26 mmol/L      ANION GAP 9 mmol/L      BUN 12 mg/dL      Creatinine 0 89 mg/dL      Glucose 94 mg/dL      Calcium 8 8 mg/dL      eGFR 80 ml/min/1 73sq m     Narrative:      [facility] guidelines for Chronic Kidney Disease (CKD):   •  Stage 1 with normal or high GFR (GFR > 90 mL/min/1 73 square meters)  •  Stage 2 Mild CKD (GFR = 60-89 mL/min/1 73 square meters)  •  Stage 3A Moderate CKD (GFR = 45-59 mL/min/1 73 square meters)  •  Stage 3B Moderate CKD (GFR = 30-44 mL/min/1 73 square meters)  •  Stage 4 Severe CKD (GFR = 15-29 mL/min/1 73 square meters)  •  Stage 5 End Stage CKD (GFR <15 mL/min/1 73 square meters)  Note: GFR calculation is accurate only with a steady state creatinine    CBC and differential [[id number]] Collected: 02/13/23 1315    Lab Status: Final result Specimen: Blood from Arm, Left Updated: 02/13/23 1321     WBC 9 22 Thousand/uL      RBC 4 51 Million/uL      Hemoglobin 14 0 g/dL      Hematocrit 42 0 %      MCV 93 fL      MCH 31 0 pg      MCHC 33 3 g/dL      RDW 14 1 %      MPV 10 3 fL      Platelets 971 Thousands/uL      nRBC 0 /100 WBCs      Neutrophils Relative 56 %      Immat GRANS % 0 %      Lymphocytes Relative 34 %      Monocytes Relative 7 %      Eosinophils Relative 2 %      Basophils Relative 1 %      Neutrophils Absolute 5 27 Thousands/µL      Immature Grans Absolute 0 02 Thousand/uL      Lymphocytes Absolute 3 10 Thousands/µL      Monocytes Absolute 0 60 Thousand/µL      Eosinophils Absolute 0 17 Thousand/µL      Basophils Absolute 0 06 Thousands/µL     UA w Reflex to Microscopic w Reflex to Culture [[id number]]  (Abnormal) Collected: 02/13/23 1237    Lab Status: Final result Specimen: Urine, Clean Catch Updated: 02/13/23 1252     Color, UA Yellow     Clarity, UA Cloudy     Specific Gravity, UA 1 025     pH, UA 6 0     Leukocytes, UA Negative     Nitrite, UA Negative     Protein, UA Negative mg/dl      Glucose, UA Negative mg/dl      Ketones, UA Negative mg/dl      Urobilinogen, UA 0 2 E U /dl      Bilirubin, UA Small     Occult Blood, UA Negative    POCT pregnancy, urine [[id number]]  (Normal) Resulted: 02/13/23 1241    Lab Status: Final result Updated: 02/13/23 1241     EXT Preg Test, Ur Negative     Control Valid                 VAS lower limb venous duplex study, unilateral/limited    (Results Pending)              Procedures  Procedures         ED Course  ED Course as of 02/13/23 1508   Mon Feb 13, 2023   1357 Updated patient  Vascular study ordered    1503 Negative vascular study                                SBIRT 20yo+    Flowsheet Row Most Recent Value   SBIRT (25 yo +)    In order to provide better care to our patients, we are screening all of our patients for alcohol and drug use  Would it be okay to ask you these screening questions? No Filed at: 02/13/2023 1240                    Medical Decision Making  Differential includes but is not limited to ovarian pain, IBS, DVT, bursitis  Patient seemed anxious  Discussed labs with the patient, patient has a slightly elevated D-dimer, as such vascular study ordered and was negative  Patient's area of pain is consistent with Pes anserine bursitis   Very minimal tenderness to LLQ has had a recent [address]  Patient is informed to return to the emergency department for worsening of symptoms and was given proper education regarding their diagnosis and symptoms  Otherwise the patient is informed to follow up with their primary care doctor/OBGYN for re-evaluation  The patient verbalizes understanding and agrees with above assessment and plan  All questions were answered  Pes anserine bursitis: acute illness or injury  Amount and/or Complexity of Data Reviewed  Labs: ordered  ECG/medicine tests: ordered  Risk  OTC drugs  Disposition  Final diagnoses:   Abdominal pain   Pes anserine bursitis     Time reflects when diagnosis was documented in both MDM as applicable and the Disposition within this note     Time User Action Codes Description Comment    2/13/2023  3:07 PM Pepper Spatz Add [R10 9] Abdominal pain     2/13/2023  3:07 PM Minna Spatz Add [M70 50] Pes anserine bursitis       ED Disposition     ED Disposition   Discharge    Condition   Stable    Date/Time   Mon Feb 13, 2023  3:07 PM    Comment   Sharon Fontenot discharge to home/self care  Follow-up Information     Follow up With Specialties Details Why Contact Info Additional Information    [address] [facility] Emergency Department Emergency Medicine Go to  If symptoms worsen such as severe pain, fevers etc, otherwise please follow up with your family doctor and OBGYN [address] [facility] [id number] [address] [address] Emergency Department, [facility], [facility]          Patient's Medications   Discharge Prescriptions    No medications on file       No discharge procedures on file      PDMP Review     None          ED Provider  Electronically Signed by           Roxana Shah PA-C  02/13/23 1389

## 2023-05-31 ENCOUNTER — OFFICE VISIT (OUTPATIENT)
Dept: URGENT CARE | Facility: CLINIC | Age: 43
End: 2023-05-31
Payer: MEDICAID

## 2023-05-31 VITALS
OXYGEN SATURATION: 98 % | HEIGHT: 62 IN | BODY MASS INDEX: 33.45 KG/M2 | TEMPERATURE: 97.7 F | SYSTOLIC BLOOD PRESSURE: 128 MMHG | WEIGHT: 181.8 LBS | DIASTOLIC BLOOD PRESSURE: 80 MMHG | HEART RATE: 84 BPM | RESPIRATION RATE: 18 BRPM

## 2023-05-31 DIAGNOSIS — S80.11XA HEMATOMA OF RIGHT LOWER LEG: Primary | ICD-10-CM

## 2023-05-31 PROCEDURE — 99213 OFFICE O/P EST LOW 20 MIN: CPT | Performed by: FAMILY MEDICINE

## 2023-06-07 NOTE — PATIENT INSTRUCTIONS
Patient has a hematoma of the right lower leg  There are no signs or symptoms of a DVT  I have advised the patient to rest the leg and keep it elevated, apply ice to the site, and may take Tylenol as needed for pain  Patient is to follow up w/ her PCP office for re-check in 3-5 days  If symptoms persist despite treatment, worsen, or any new symptoms present, patient is to be seen in the ER

## 2023-06-07 NOTE — PROGRESS NOTES
[facility]        NAME: Eva Pierre is a 40 y o  female  : 1980    MRN: [id number]  DATE: May 31, 2023  TIME: 4:00 PM     Assessment and Plan   Hematoma of right lower leg [S80 11XA]  1  Hematoma of right lower leg              Patient Instructions     Patient Instructions   Patient has a hematoma of the right lower leg  There are no signs or symptoms of a DVT  I have advised the patient to rest the leg and keep it elevated, apply ice to the site, and may take Tylenol as needed for pain  Patient is to follow up w/ her PCP office for re-check in 3-5 days  If symptoms persist despite treatment, worsen, or any new symptoms present, patient is to be seen in the ER  Follow up with PCP in 3-5 days  Proceed to  ER if symptoms worsen  Chief Complaint     Chief Complaint   Patient presents with   • Leg Pain     Pt here for right leg pain, states no injury, states her vein became swollen and tender  History of Present Illness       41 yo female presents complaining of an area of bruising on her right lower leg x 1 day  Patient states she was standing in her kitchen cooking, when she noticed that a varicose vein on her right lower leg felt hard and tender, and shortly after a blackish/blue bruise was present at the site  She denies any falls or recent injuries to the leg  No redness or warmth of the skin  No knee or ankle joint pain  She is able to walk and bear weight on the leg without any difficulty  No numbness/tingling or weakness of the leg  No fever/chills  No calf pain or swelling  No chest pain or SOB  Patient denies any history of DVT/PE  Patient has no known risk factors for DVTs  She is not on any blood thinners  She has not attempted any treatment for the symptoms  She denies any chance of pregnancy  Review of Systems   Review of Systems   Constitutional: Negative  Respiratory: Negative  Cardiovascular: Negative  Gastrointestinal: Negative      Musculoskeletal: "       As noted in HPI   Skin:        As noted in HPI   Allergic/Immunologic:        As noted in chart   Neurological: Negative  Hematological: Negative  Current Medications       Current Outpatient Medications:   •  albuterol (PROVENTIL HFA,VENTOLIN HFA) 90 mcg/act inhaler, Inhale 2 puffs every 6 (six) hours as needed for wheezing, Disp: , Rfl:     Current Allergies     Allergies as of 2023 - Reviewed 2023   Allergen Reaction Noted   • Ketorolac Swelling 2021   • Latex Hives 2019   • Morphine Rash 10/25/2018            The following portions of the patient's history were reviewed and updated as appropriate: allergies, current medications, past family history, past medical history, past social history, past surgical history and problem list      Past Medical History:   Diagnosis Date   • Allergic    • History of IBS    • Ovarian cyst    • Patient denies medical problems    • Uterine fibroid        Past Surgical History:   Procedure Laterality Date   •  SECTION     • CHOLECYSTECTOMY     • DILATION AND CURETTAGE OF UTERUS         Family History   Adopted: Yes         Medications have been verified  Objective   /80   Pulse 84   Temp 97 7 °F (36 5 °C)   Resp 18   Ht 5' 2\" (1 575 m)   Wt 82 5 kg (181 lb 12 8 oz)   SpO2 98%   BMI 33 25 kg/m²   No LMP recorded  Physical Exam     Physical Exam  Vitals and nursing note reviewed  Constitutional:       General: She is awake  She is not in acute distress  Appearance: Normal appearance  She is well-developed and well-groomed  She is not ill-appearing, toxic-appearing or diaphoretic  Cardiovascular:      Rate and Rhythm: Normal rate and regular rhythm  Pulses: Normal pulses  Heart sounds: Normal heart sounds  Pulmonary:      Effort: Pulmonary effort is normal  No tachypnea, accessory muscle usage or respiratory distress  Breath sounds: Normal breath sounds and air entry   " Musculoskeletal:      Comments: Right lower leg: there is localized bruising and mild tenderness to palpation of the lateral aspect of the lower leg below the knee  The area is slightly swollen and appears to be consistent with a hematoma  Overlying skin is appropriately warm and intact, no erythema, no wounds  There is no swelling, discoloration, or tenderness of the calf, ankle, or foot  Knee and ankle joints have full ROM, no pain w/ movement  2+ pedal pulses, good capillary refill, strength and sensations intact  Normal gait  Varicose veins present  Skin:     General: Skin is warm and dry  Capillary Refill: Capillary refill takes less than 2 seconds  Coloration: Skin is not pale  Findings: Bruising and ecchymosis present  No abrasion, erythema, rash or wound  Neurological:      Mental Status: She is alert and oriented to person, place, and time  Mental status is at baseline  Psychiatric:         Mood and Affect: Mood normal          Behavior: Behavior normal  Behavior is cooperative  Thought Content:  Thought content normal          Judgment: Judgment normal

## 2023-08-04 ENCOUNTER — OFFICE VISIT (OUTPATIENT)
Dept: URGENT CARE | Facility: CLINIC | Age: 43
End: 2023-08-04
Payer: MEDICAID

## 2023-08-04 VITALS
WEIGHT: 177 LBS | HEIGHT: 62 IN | OXYGEN SATURATION: 100 % | DIASTOLIC BLOOD PRESSURE: 70 MMHG | HEART RATE: 73 BPM | RESPIRATION RATE: 20 BRPM | TEMPERATURE: 97.5 F | BODY MASS INDEX: 32.57 KG/M2 | SYSTOLIC BLOOD PRESSURE: 110 MMHG

## 2023-08-04 DIAGNOSIS — H00.014 HORDEOLUM EXTERNUM OF LEFT UPPER EYELID: Primary | ICD-10-CM

## 2023-08-04 PROCEDURE — 99213 OFFICE O/P EST LOW 20 MIN: CPT | Performed by: PREVENTIVE MEDICINE

## 2023-08-04 RX ORDER — GENTAMICIN SULFATE 3 MG/ML
1 SOLUTION/ DROPS OPHTHALMIC EVERY 4 HOURS
Qty: 5 ML | Refills: 0 | Status: SHIPPED | OUTPATIENT
Start: 2023-08-04

## 2023-08-04 NOTE — PROGRESS NOTES
[facility]        NAME: Pricilla Wasserman is a 40 y.o. female  : 1980    MRN: [id number]  DATE: 2023  TIME: 11:40 AM    Assessment and Plan   Hordeolum externum of left upper eyelid [H00.014]  1. Hordeolum externum of left upper eyelid              Patient Instructions       Follow up with PCP in 3-5 days. Proceed to  ER if symptoms worsen. Chief Complaint     Chief Complaint   Patient presents with   • Conjunctivitis     Pt here for  left eye swelling, upper lid, some discharge, and pain. On going x2 days. History of Present Illness       Swollen painful left upper lid with discharge      Review of Systems   Review of Systems   Eyes: Positive for pain and discharge. Current Medications       Current Outpatient Medications:   •  albuterol (PROVENTIL HFA,VENTOLIN HFA) 90 mcg/act inhaler, Inhale 2 puffs every 6 (six) hours as needed for wheezing, Disp: , Rfl:     Current Allergies     Allergies as of 2023 - Reviewed 2023   Allergen Reaction Noted   • Ketorolac Swelling 2021   • Latex Hives 2019   • Morphine Rash 10/25/2018            The following portions of the patient's history were reviewed and updated as appropriate: allergies, current medications, past family history, past medical history, past social history, past surgical history and problem list.     Past Medical History:   Diagnosis Date   • Allergic    • History of IBS    • Ovarian cyst    • Patient denies medical problems    • Uterine fibroid        Past Surgical History:   Procedure Laterality Date   •  SECTION     • CHOLECYSTECTOMY     • DILATION AND CURETTAGE OF UTERUS         Family History   Adopted: Yes         Medications have been verified. Objective   /70   Pulse 73   Temp 97.5 °F (36.4 °C)   Resp 20   Ht 5' 2" (1.575 m)   Wt 80.3 kg (177 lb)   SpO2 100%   BMI 32.37 kg/m²   No LMP recorded.        Physical Exam     Physical Exam  Eyes: Comments: The left upper lid is swollen. Cannot visualize an actual stye. The conjunctiva was not injected.

## 2023-08-04 NOTE — PATIENT INSTRUCTIONS
Make-up a solution of warm water and Epsom salt and use it to do compresses  on the eye 4 times a day.

## 2024-03-18 ENCOUNTER — APPOINTMENT (EMERGENCY)
Dept: RADIOLOGY | Facility: HOSPITAL | Age: 44
End: 2024-03-18
Payer: MEDICAID

## 2024-03-18 ENCOUNTER — HOSPITAL ENCOUNTER (EMERGENCY)
Facility: HOSPITAL | Age: 44
Discharge: HOME HEALTH | End: 2024-03-18
Attending: EMERGENCY MEDICINE
Payer: MEDICAID

## 2024-03-18 VITALS
WEIGHT: 175 LBS | SYSTOLIC BLOOD PRESSURE: 105 MMHG | HEIGHT: 62 IN | RESPIRATION RATE: 18 BRPM | TEMPERATURE: 97.6 F | DIASTOLIC BLOOD PRESSURE: 62 MMHG | BODY MASS INDEX: 32.2 KG/M2 | HEART RATE: 78 BPM | OXYGEN SATURATION: 98 %

## 2024-03-18 DIAGNOSIS — S82.899A ANKLE FRACTURE: Primary | ICD-10-CM

## 2024-03-18 PROCEDURE — 99283 EMERGENCY DEPT VISIT LOW MDM: CPT

## 2024-03-18 PROCEDURE — 73630 X-RAY EXAM OF FOOT: CPT

## 2024-03-18 PROCEDURE — 73610 X-RAY EXAM OF ANKLE: CPT

## 2024-03-18 PROCEDURE — 99284 EMERGENCY DEPT VISIT MOD MDM: CPT | Performed by: PHYSICIAN ASSISTANT

## 2024-03-18 NOTE — ED PROVIDER NOTES
History  Chief Complaint   Patient presents with    Ankle Pain     Left ankle pain after stepping wrong and states she heard crackling     43-year-old female presenting today with left-sided ankle and foot pain that began just prior to arrival, states that she was walking her dog and went to step, when she stepped wrong causing a rolling of the ankle and landing directly onto the ankle.  Notes some numbness and tingling to the outside of the foot and to lateral toes, pain with ambulation.  States that the knee is mildly sore however nontender.        Prior to Admission Medications   Prescriptions Last Dose Informant Patient Reported? Taking?   albuterol (PROVENTIL HFA,VENTOLIN HFA) 90 mcg/act inhaler   Yes No   Sig: Inhale 2 puffs every 6 (six) hours as needed for wheezing   gentamicin (GARAMYCIN) 0.3 % ophthalmic solution   No No   Sig: Administer 1 drop into the left eye every 4 (four) hours      Facility-Administered Medications: None       Past Medical History:   Diagnosis Date    Allergic     History of IBS     Ovarian cyst     Patient denies medical problems     Uterine fibroid        Past Surgical History:   Procedure Laterality Date     SECTION      CHOLECYSTECTOMY      DILATION AND CURETTAGE OF UTERUS         Family History   Adopted: Yes     I have reviewed and agree with the history as documented.    E-Cigarette/Vaping    E-Cigarette Use Never User      E-Cigarette/Vaping Substances     Social History     Tobacco Use    Smoking status: Every Day     Current packs/day: 0.00     Average packs/day: 0.5 packs/day for 20.0 years (10.0 ttl pk-yrs)     Types: Cigarettes     Start date: 2001     Last attempt to quit: 2021     Years since quitting: 3.0     Passive exposure: Past    Smokeless tobacco: Never   Vaping Use    Vaping status: Never Used   Substance Use Topics    Alcohol use: Not Currently    Drug use: Never       Review of Systems   Constitutional: Negative.  Negative for chills,  fatigue and fever.   HENT: Negative.  Negative for congestion, postnasal drip, rhinorrhea and sore throat.    Eyes: Negative.    Respiratory: Negative.  Negative for cough, shortness of breath and wheezing.    Cardiovascular: Negative.    Gastrointestinal: Negative.  Negative for abdominal pain, diarrhea, nausea and vomiting.   Endocrine: Negative.    Genitourinary: Negative.    Musculoskeletal:  Positive for arthralgias. Negative for back pain, gait problem, joint swelling, myalgias, neck pain and neck stiffness.   Skin: Negative.    Neurological: Negative.    Hematological: Negative.    Psychiatric/Behavioral: Negative.     All other systems reviewed and are negative.      Physical Exam  Physical Exam  Vitals and nursing note reviewed.   Constitutional:       Appearance: Normal appearance.   HENT:      Head: Normocephalic and atraumatic.      Right Ear: External ear normal.      Left Ear: External ear normal.      Nose: Nose normal.   Eyes:      Conjunctiva/sclera: Conjunctivae normal.   Cardiovascular:      Rate and Rhythm: Normal rate.      Pulses: Normal pulses.   Pulmonary:      Effort: Pulmonary effort is normal.   Abdominal:      General: There is no distension.   Musculoskeletal:         General: Tenderness present. No deformity. Normal range of motion.      Cervical back: Normal range of motion.        Legs:    Skin:     General: Skin is warm and dry.      Findings: No rash.   Neurological:      General: No focal deficit present.      Mental Status: She is alert and oriented to person, place, and time. Mental status is at baseline.   Psychiatric:         Mood and Affect: Mood normal.         Behavior: Behavior normal.         Thought Content: Thought content normal.         Judgment: Judgment normal.         Vital Signs  ED Triage Vitals   Temperature Pulse Respirations Blood Pressure SpO2   03/18/24 1247 03/18/24 1248 03/18/24 1247 03/18/24 1248 03/18/24 1247   97.6 °F (36.4 °C) 78 18 105/62 98 %       Temp src Heart Rate Source Patient Position - Orthostatic VS BP Location FiO2 (%)   -- -- -- -- --             Pain Score       03/18/24 1247       4           Vitals:    03/18/24 1248   BP: 105/62   Pulse: 78         Visual Acuity      ED Medications  Medications - No data to display    Diagnostic Studies  Results Reviewed       None                   XR ankle 3+ views LEFT   ED Interpretation by Josefina Garcia PA-C (03/18 1319)   Questionable chip fracture off the talus and distal tibia       XR foot 3+ views LEFT    (Results Pending)              Procedures  Procedures         ED Course                               SBIRT 20yo+      Flowsheet Row Most Recent Value   Initial Alcohol Screen: US AUDIT-C     1. How often do you have a drink containing alcohol? 0 Filed at: 03/18/2024 1247   2. How many drinks containing alcohol do you have on a typical day you are drinking?  0 Filed at: 03/18/2024 1247   3a. Male UNDER 65: How often do you have five or more drinks on one occasion? 0 Filed at: 03/18/2024 1247   3b. FEMALE Any Age, or MALE 65+: How often do you have 4 or more drinks on one occassion? 0 Filed at: 03/18/2024 1247   Audit-C Score 0 Filed at: 03/18/2024 1247   GWEN: How many times in the past year have you...    Used an illegal drug or used a prescription medication for non-medical reasons? Never Filed at: 03/18/2024 1247                      Medical Decision Making  Questionable chip fracture off the talus and distal tibia noted on lateral view.  Patient was placed in a boot and taught how to use will have patient follow-up with orthopedics.     Patient is informed to return to the emergency department for worsening of symptoms and was given proper education regarding their diagnosis and symptoms. Otherwise the patient is informed to follow up with orthopedics for re-evaluation. The patient verbalizes understanding and agrees with above assessment and plan. All questions were answered.           Amount and/or Complexity of Data Reviewed  Radiology: ordered and independent interpretation performed.             Disposition  Final diagnoses:   Ankle fracture     Time reflects when diagnosis was documented in both MDM as applicable and the Disposition within this note       Time User Action Codes Description Comment    3/18/2024  1:20 PM Josefina Garcia Add [S82.899A] Ankle fracture           ED Disposition       ED Disposition   Discharge    Condition   Stable    Date/Time   Mon Mar 18, 2024 1320    Comment   Mikhail Sanjana discharge to home/self care.                   Follow-up Information       Follow up With Specialties Details Why Contact Info Additional Information    Nathan Solorio, DO Orthopedic Surgery Schedule an appointment as soon as possible for a visit   [address], [address]  [facility] [id number] [phone]       [facility] Emergency Department Emergency Medicine Go to  If symptoms worsen [address] [id number] [phone] Critical access hospital Emergency Department, [address], [place], [address]            Patient's Medications   Discharge Prescriptions    No medications on file       No discharge procedures on file.    PDMP Review       None            ED Provider  Electronically Signed by             Josefina Garcia PA-C  03/18/24 1320

## 2024-03-18 NOTE — ED NOTES
Pt seen, assessed and d/c by provider. Pt appeared to be in no acute distress upon discharge. Pt able to ambulate well without assistance upon exiting.      Hermelinda Kaur RN  03/18/24 [id number]